# Patient Record
Sex: MALE | Race: WHITE | NOT HISPANIC OR LATINO | Employment: FULL TIME | URBAN - METROPOLITAN AREA
[De-identification: names, ages, dates, MRNs, and addresses within clinical notes are randomized per-mention and may not be internally consistent; named-entity substitution may affect disease eponyms.]

---

## 2017-07-19 ENCOUNTER — NON-PROVIDER VISIT (OUTPATIENT)
Dept: OCCUPATIONAL MEDICINE | Facility: CLINIC | Age: 52
End: 2017-07-19

## 2017-07-19 VITALS
BODY MASS INDEX: 30.2 KG/M2 | HEART RATE: 72 BPM | SYSTOLIC BLOOD PRESSURE: 112 MMHG | TEMPERATURE: 98.8 F | OXYGEN SATURATION: 98 % | DIASTOLIC BLOOD PRESSURE: 72 MMHG | WEIGHT: 223 LBS | HEIGHT: 72 IN

## 2017-07-19 DIAGNOSIS — Z71.84 TRAVEL ADVICE ENCOUNTER: ICD-10-CM

## 2017-07-19 DIAGNOSIS — Z23 ENCOUNTER FOR IMMUNIZATION: ICD-10-CM

## 2017-07-19 PROCEDURE — 90471 IMMUNIZATION ADMIN: CPT | Performed by: PREVENTIVE MEDICINE

## 2017-07-19 PROCEDURE — 90715 TDAP VACCINE 7 YRS/> IM: CPT | Performed by: PREVENTIVE MEDICINE

## 2017-07-19 PROCEDURE — 90472 IMMUNIZATION ADMIN EACH ADD: CPT

## 2017-07-19 PROCEDURE — 99999 PR NO CHARGE: CPT | Performed by: PREVENTIVE MEDICINE

## 2017-07-19 PROCEDURE — 90734 MENACWYD/MENACWYCRM VACC IM: CPT

## 2017-07-19 RX ORDER — AZITHROMYCIN 500 MG/1
1000 TABLET, FILM COATED ORAL ONCE
Qty: 6 TAB | Refills: 0 | Status: SHIPPED | OUTPATIENT
Start: 2017-07-19 | End: 2017-07-19

## 2017-07-19 RX ORDER — ATOVAQUONE AND PROGUANIL HYDROCHLORIDE 250; 100 MG/1; MG/1
1 TABLET, FILM COATED ORAL DAILY
Qty: 30 TAB | Refills: 0 | Status: SHIPPED | OUTPATIENT
Start: 2017-07-19

## 2017-07-19 RX ORDER — ACETAZOLAMIDE 125 MG/1
125 TABLET ORAL 2 TIMES DAILY
Qty: 16 TAB | Refills: 0 | Status: SHIPPED | OUTPATIENT
Start: 2017-07-19 | End: 2017-07-27

## 2017-07-19 NOTE — MR AVS SNAPSHOT
Jonas Lima   2017 2:00 PM   Non-Provider Visit   MRN: [id number]    Department:  [facility]   Dept Phone:  [phone]    Description:  Male : 1965   Provider:  [facility] RN           Allergies as of 2017     No Known Allergies      You were diagnosed with     Encounter for immunization   [947030]       Travel advice encounter   [[id number]]         Vital Signs     Smoking Status                   Never Smoker            Basic Information     Date Of Birth Sex Race Ethnicity Preferred Language    1965 Male White Non- English      Health Maintenance     Patient has no pending health maintenance at this time      Current Immunizations     Meningococcal Conjugate Vaccine MCV4 (Menactra) 2017    Tdap Vaccine 2017      Below and/or attached are the medications your provider expects you to take. Review all of your home medications and newly ordered medications with your provider and/or pharmacist. Follow medication instructions as directed by your provider and/or pharmacist. Please keep your medication list with you and share with your provider. Update the information when medications are discontinued, doses are changed, or new medications (including over-the-counter products) are added; and carry medication information at all times in the event of emergency situations     Allergies:  No Known Allergies          Medications  Valid as of: 2017 -  3:35 PM    Generic Name Brand Name Tablet Size Instructions for use    AcetaZOLAMIDE (Tab) DIAMOX 125 MG Take 1 Tab by mouth 2 times a day for 8 days. Start day before ascent. Take 3 additional days at elevation        Amoxicillin-Pot Clavulanate (Tab) AUGMENTIN 875-125 MG Take 1 Tab by mouth 2 times a day.        Atovaquone-Proguanil HCl (Tab) MALARONE 250-100 MG Take 1 Tab by mouth every day. Start two days before travel.        Azithromycin (Tab) ZITHROMAX 500 MG Take 2 Tabs by mouth Once for 1 dose. For  traveler's diarrhea if needed        Oxycodone-Acetaminophen (Tab) PERCOCET 5-325 MG Take 1-2 Tabs by mouth every 6 hours as needed.        .                 Medicines prescribed today were sent to:     None      Medication refill instructions:       If your prescription bottle indicates you have medication refills left, it is not necessary to call your provider’s office. Please contact your pharmacy and they will refill your medication.    If your prescription bottle indicates you do not have any refills left, you may request refills at any time through one of the following ways: The online Yub system (except Urgent Care), by calling your provider’s office, or by asking your pharmacy to contact your provider’s office with a refill request. Medication refills are processed only during regular business hours and may not be available until the next business day. Your provider may request additional information or to have a follow-up visit with you prior to refilling your medication.   *Please Note: Medication refills are assigned a new Rx number when refilled electronically. Your pharmacy may indicate that no refills were authorized even though a new prescription for the same medication is available at the pharmacy. Please request the medicine by name with the pharmacy before contacting your provider for a refill.           Yub Access Code: [email or web address]  Expires: 8/18/2017  1:59 PM    Yub  A secure, online tool to manage your health information     [employer]’s Yub® is a secure, online tool that connects you to your personalized health information from the privacy of your home -- day or night - making it very easy for you to manage your healthcare. Once the activation process is completed, you can even access your medical information using the Yub maria m, which is available for free in the Apple Maria M store or Google Play store.     Yub provides the following levels of access (as shown  below):   My Chart Features   [facility] Doctor [facility]  Urgent  Care [facility]  Primary Care  Doctor   Email your healthcare team securely and privately 24/7 X X X    Manage appointments: schedule your next appointment; view details of past/upcoming appointments X      Request prescription refills. X      View recent personal medical records, including lab and immunizations X X X X   View health record, including health history, allergies, medications X X X X   Read reports about your outpatient visits, procedures, consult and ER notes X X X X   See your discharge summary, which is a recap of your hospital and/or ER visit that includes your diagnosis, lab results, and care plan. X X       How to register for [employer]:  1. Go to  [email or web address].  2. Click on the Sign Up Now box, which takes you to the New Member Sign Up page. You will need to provide the following information:  a. Enter your Wedge Buster Access Code exactly as it appears at the top of this page. (You will not need to use this code after you’ve completed the sign-up process. If you do not sign up before the expiration date, you must request a new code.)   b. Enter your date of birth.   c. Enter your home email address.   d. Click Submit, and follow the next screen’s instructions.  3. Create a Wedge Buster ID. This will be your Wedge Buster login ID and cannot be changed, so think of one that is secure and easy to remember.  4. Create a Wedge Buster password. You can change your password at any time.  5. Enter your Password Reset Question and Answer. This can be used at a later time if you forget your password.   6. Enter your e-mail address. This allows you to receive e-mail notifications when new information is available in Wedge Buster.  7. Click Sign Up. You can now view your health information.    For assistance activating your Wedge Buster account, call [phone]

## 2017-07-19 NOTE — PROGRESS NOTES
Travel dates: not disclosed, pt uncertain  when leaving   Countries to be visited: not disclosed, pt stated he will visit various countries in [place] and [place]  Reason for travel: not disclosed      Rural travel: possible  High altitude travel: possible    Accommodations:  Hotel:yes   Hostel:  Camping:yes  Cruise:  With family:  Other:    Vaccines in past 30 days? No  Sick today? No  Allergies: None    The screening intake form was reviewed with the traveler. Health risks associated with their travel plans have been reviewed and discussed with the traveler. The traveler has been provided with vaccine information statements for the vaccines that are recommended and given the opportunity to discuss risks and benefits of vaccination and or medications. The traveler has received education on the travel itinerary provided and on the following topics.    Personal safety precautions: Yes  Food and water precautions: Yes  Management of traveler's diarrhea: Yes  Mosquito/insect bite prevention:Yes  Animal bites/Rabies prevention: yes  High altitude precautions: yes      RN comments:   Vaccines administered, per standing orders  Malaria prophylaxis recommended. Risks and benefits reviewed.   Travelers diarrhea self tx recommended. Risks and benefits reviewed.          Physician consultation required: yes    Travel Health Consult:  HPI: Agree with nurses noted above. Patient does not have a firm travel itinerary but will be in malarial sounds for at least 2 weeks and possibly up to 3 weeks. He may also be at high altitude in [place] and into bed. Areas of travel may also include [place]. He has traveled overseas before and is familiar with malarial chemoprophylaxis, traveler's diarrhea and high altitude sickness.    ROS: All systems were reviewed on intake form, form was reviewed and signed. See scanned documents in media. Pertinent positives and negatives included in HPI.    PMH: Rheumatoid Arthritis  MEDS:  Plaquenil 300mg once daily  ALLERGIES: No Known Allergies  SOCHX: Retired  FH: No pertinent family history to this problem    Objective:  /72 mmHg  Pulse 72  Temp(Src) 37.1 °C (98.8 °F)  Ht 1.829 m (6')  Wt 101.152 kg (223 lb)  BMI 30.24 kg/m2  SpO2 98%      Constitutional: He is oriented to person, place, and time. He appears well-developed and well-nourished.   HENT: Normocephalic and atraumatic. EOM are normal. No scleral icterus.   Cardiovascular: Normal rate.   Pulmonary/Chest: Effort normal. No respiratory distress.    Neurological: He is alert and oriented to person, place, and time.   Skin: Skin is warm and dry.   Psychiatric: He has a normal mood and affect. His behavior is normal.       Assessment:    Travel Health Consult    Plan:  - Discussed the risks and benefits of malaria chemoprophylaxis. The patient elected to use Malarone. Duration was determined to be 3 weeks of travel given the uncertain itinerary the maximum time of malaria exposure was used.  - Prescribed Malarone for 30 days.  - Prescribed azithromycin 1000 mg once to use in case of traveler's diarrhea. Given prolonged travel case 3 doses.  - Recommend to start treatment of diarrhea with Imodium over-the-counter and if diarrhea continues to then use the azithromycin  Prescribed C Sulamyd 125 mg twice daily. To begin one day before sent and to take up to 3 days if at high altitude. Given uncertain itinerary gave 8 days worth  - Education and vaccines as provided by travel health nurse.